# Patient Record
(demographics unavailable — no encounter records)

---

## 2025-01-31 NOTE — HEALTH RISK ASSESSMENT
[Good] : ~his/her~  mood as  good [Yes] : Yes [2 - 4 times a month (2 pts)] : 2-4 times a month (2 points) [1 or 2 (0 pts)] : 1 or 2 (0 points) [No] : In the past 12 months have you used drugs other than those required for medical reasons? 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

## 2025-01-31 NOTE — PLAN
[FreeTextEntry1] : Will send for screening blood work. Discussed hyperlipidemia at length. Perform ACS VD calculation-which is close to 2%.  Patient fairly low risk.  Will repeat lipids and will continue to attempt weight loss.  Will continue compounded semaglutide Continues to receive mammogram-recent mammogram showing new questionable masses.  Pending repeat mammogram in 6 months To follow-up with urology regarding hemorrhagic cyst-questionable surveillance needed Will follow-up with colonoscopy EKG indicative of normal sinus rhythm with no acute ST or T wave findings

## 2025-01-31 NOTE — HISTORY OF PRESENT ILLNESS
[de-identified] : 48-year-old female presents for physical. 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  Also receiving Lupron for fibroids. Overall patient feels well.  Following up with weight management and receiving compounded semaglutide. Has lost 7 pounds since starting. Receptive to colonoscopy

## 2025-03-13 NOTE — PLAN
[FreeTextEntry1] : MRI Pelvis August 2025.   Pt to return to office for Annual GYN and f/u with MRI results

## 2025-03-13 NOTE — HISTORY OF PRESENT ILLNESS
[FreeTextEntry1] : Pt is a 48-year-old who presents for Lupron injection #2 for fibroid maintenance. Patient with no active complaints. Explained risks and benefits of IM injection as well as signs and symptoms to be aware of after injection. Consent signed by patient and AP, NP. IM injection to Left Deltoid. Patient tolerated well. MRI in August 2025.   Lupron [place] NDC [id number] EXP 03/2027 LOT [id number]

## 2025-03-13 NOTE — DISCUSSION/SUMMARY
[FreeTextEntry1] : I spent the time noted on the day of this patient encounter preparing for, providing and documenting the above service. I have  counseled and educated the patient on the differential, workup, disease course, and treatment/management plan. Education was provided to the patient during this encounter. All questions and concerns were answered and addressed in detail.  Petra Celaya NP, FNP-BC

## 2025-03-25 NOTE — PHYSICAL EXAM
[Normal Appearance] : normal appearance [Well Groomed] : well groomed [General Appearance - In No Acute Distress] : no acute distress [Edema] : no peripheral edema [Respiration, Rhythm And Depth] : normal respiratory rhythm and effort [Exaggerated Use Of Accessory Muscles For Inspiration] : no accessory muscle use [Abdomen Soft] : soft [Abdomen Tenderness] : non-tender [Costovertebral Angle Tenderness] : no ~M costovertebral angle tenderness [Normal Station and Gait] : the gait and station were normal for the patient's age [Urinary Bladder Findings] : the bladder was normal on palpation [] : no rash [No Focal Deficits] : no focal deficits [Oriented To Time, Place, And Person] : oriented to person, place, and time [Affect] : the affect was normal [Mood] : the mood was normal

## 2025-03-26 NOTE — HISTORY OF PRESENT ILLNESS
[None] : None [FreeTextEntry1] : Referring Provider: Florentino Harkins NP Chief Complaint: Renal lesion Date of first visit: 03/24/2025 Occupation:    [employer] MAXWELL is a 48 year old  woman with a PMHx of atypical ductal hyperplasia of breast, breast cyst, uterine fibroids on Lupron therapy, hyperlipidemia presenting who presents for evaluation of renal mass.  Underwent CT in July 2024 for abdominal periumbilical pain which showed a 1.8 left interpolar renal hypodense lesion.  Her PCP then ordered an MRI abdomen, which showed a 1.5 cm left mid/lower pole cyst and a 0.6 cm left simple cyst.  History of kidney stones in December 2023, however patient believes that she had spontaneously passed them prior to imaging study.  Denies flank pain, gross hematuria or UTIs.  She is satisfied with the current state of her urinary habits.  CT Hx 7/23/2024 CT ab/pel: ADRENALS: Within normal limits. KIDNEYS/URETERS: 1.8 cm left interpolar renal hypodense lesion.  Suboptimally evaluated on this noncontrast CT.  No calculi.  No hydronephrosis.  No perinephric stranding. URETERS: No calculi.  No hydroureter.  BLADDER: No calculi.  No wall thickening. IMPRESSION: Moderate umbilical hernia.  Finding in the left kidney, suggest MRI abdomen WWO.  Finding in left uterus, suggest MRI pelvis WWO.  12/1/2023 CT ABD/PEL: ADRENALS: Within normal limits. KIDNEYS/URETERS: No hydronephrosis or calculi. BLADDER: Within normal limits. IMPRESSION: Enlarged cervix. Recommend GYN evaluation to exclude cervical carcinoma. Pelvic ultrasound or MRI also advised. Constipated right colon.   MRI Hx 8/8/2024 MR ABDOMEN WAW IC. ADRENALS: Within normal limits. KIDNEYS/URETERS: A 1.5 cm predominantly T2 hyperintense left renal lesion is visible on image 4-29; the lesion demonstrates predominantly low T1 signal intensity on image 10-62 with a small hyperintense focus within posterior/dependent side of the lesion measuring 8 mm, suggesting a hemorrhagic component. There is no restricted diffusion visible within the lesion; the lesion completely is a 2-intensity with the background renal parenchyma on image 24-49 remains hyperintense on the ADC map image 25-10. After injection of contrast through the lesion demonstrates no enhancement which is best seen on the postcontrast subtraction image 18-57. The constellation of MRI findings is consistent with a small hemorrhagic cyst. There is an additional smaller simple cysts visible in the midpole of the kidney, best seen on image 4-25 measuring 6 mm, demonstrating high T2 signal intensity, low T1 signal intensity, and no enhancement. No right renal lesions identified. No hydronephrosis visible. IMPRESSION: A hemorrhagic cyst of 1.5 cm visible in the mid/lower pole of the left kidney. A 0.6 cm simple cyst is also noted in the midpole of the left kidney. Cholecystectomy.    PMHx: atypical ductal hyperplasia of breast, breast cellulitis, uterine fibroids on Lupron therapy, hyperlipidemia SxHx: Lumpectomy, cholecystectomy FHx: Father: Prostate CA SocHx: Never smoker Allergies: NKDA      The patient denies fevers, chills, nausea and or vomiting and no unexplained weight loss. All pertinent laboratory, films and physician notes were reviewed.  Questionnaire results were discussed with patient.

## 2025-03-26 NOTE — ASSESSMENT
[FreeTextEntry1] : 48F with a 1.5 cm left mid/lower pole hemorrhagic cyst and a 0.6 cm left simple cyst noted on 8/8/2024 MRI abdomen.  Patient remains asymptomatic.   -Imaging studies reviewed with patient.  There was no enhancement of lesions demonstrated on postcontrast MRI imaging -LILLIAN in 1 year         Du Cano MD  Chief of Urology, [address], [facility] [address]  [place] [address]  Phone: [phone]  Fax: [phone]

## 2025-03-26 NOTE — ASSESSMENT
[FreeTextEntry1] : 48F with a 1.5 cm left mid/lower pole hemorrhagic cyst and a 0.6 cm left simple cyst noted on 8/8/2024 MRI abdomen.  Patient remains asymptomatic.   -Imaging studies reviewed with patient.  There was no enhancement of lesions demonstrated on postcontrast MRI imaging -LILLIAN in 1 year         Du Cano MD  Chief of Urology, [address], [facility] [address]tr[address]  [place] [address]  Phone: [phone]  Fax: [phone]

## 2025-03-26 NOTE — ASSESSMENT
[FreeTextEntry1] : 48F with a 1.5 cm left mid/lower pole hemorrhagic cyst and a 0.6 cm left simple cyst noted on 8/8/2024 MRI abdomen.  Patient remains asymptomatic.   -Imaging studies reviewed with patient.  There was no enhancement of lesions demonstrated on postcontrast MRI imaging -LILLIAN in 1 year         Du Cano MD  Chief of Urology, [address], [facility] [address]tr[address]  [place] [address]  Phone: [phone]  Fax: [phone]